# Patient Record
Sex: MALE | Race: ASIAN | NOT HISPANIC OR LATINO | URBAN - METROPOLITAN AREA
[De-identification: names, ages, dates, MRNs, and addresses within clinical notes are randomized per-mention and may not be internally consistent; named-entity substitution may affect disease eponyms.]

---

## 2017-05-19 ENCOUNTER — EMERGENCY (EMERGENCY)
Facility: HOSPITAL | Age: 57
LOS: 1 days | Discharge: HOME | End: 2017-05-19
Attending: EMERGENCY MEDICINE
Payer: MEDICAID

## 2017-05-19 VITALS
OXYGEN SATURATION: 97 % | TEMPERATURE: 99 F | HEIGHT: 66 IN | SYSTOLIC BLOOD PRESSURE: 131 MMHG | HEART RATE: 87 BPM | WEIGHT: 179.9 LBS | DIASTOLIC BLOOD PRESSURE: 72 MMHG | RESPIRATION RATE: 16 BRPM

## 2017-05-19 PROCEDURE — 99283 EMERGENCY DEPT VISIT LOW MDM: CPT

## 2017-05-19 PROCEDURE — 99282 EMERGENCY DEPT VISIT SF MDM: CPT

## 2017-05-19 NOTE — ED PROVIDER NOTE - OBJECTIVE STATEMENT
55 yo M with HTN, HLP, DM, depression, peripheral neuropathy that presents with generalized weakness x 2 years. Reports that he gets weak after walking approx 10 mins. No chest pain, SOB, vision/hearing changes, abd pain, N/V/D, travel, chills, night sweats, weight loss. He has seen PMD, neurology, cardiology, PMR, pain mgmt and all workup is negative. He brought paperwork showing normal MRI, CT head/brain, cardiac workup and stress, carotid US neg, muscle testing normal. He had recent XR knees which shows arthritis but no Fx or other pathology. He is here for another opinion. Denies any new or changes in symptoms. No change in meds.

## 2017-05-19 NOTE — ED ADULT TRIAGE NOTE - CHIEF COMPLAINT QUOTE
multiple loss of balance over 2 year, right knee pain over 1 year. went to multiple hospitals with no concrete answer.

## 2017-05-19 NOTE — ED ADULT NURSE NOTE - MUSCULOSKELETAL WDL
Full range of motion of upper and lower extremities, no joint tenderness/swelling. ambulates with cane

## 2017-05-19 NOTE — ED PROVIDER NOTE - MEDICAL DECISION MAKING DETAILS
55 yo M that multiple medical problems that was brought in by family for generalized weakness symptoms ongoing x 2 years. Has seen multiple MDs including PMD, neuro, cards, PMR and all workup has been found to be normal. No new symptoms. Explained to pt that I can obtain labs and imaging but most likely not going to find any new abnormalities as no new symptoms. All chronic. Eating and drinking well. Symptoms onset after 10 mins walking but just weakness. No SOB, chest pain, vision/hearing changes. Unlikely cardiac. Has had MRI and US and thorough workup and all neg. They do not want to stay for further testing. Will go home f/u with PMD and neuro. I explained return for worsening symptoms such as inability to walk or eat or drink or fevers, chills.

## 2017-05-19 NOTE — ED PROVIDER NOTE - PMH
Arthritis    Diabetes    Essential hypertension    Hyperlipidemia, unspecified hyperlipidemia type    Peripheral vascular disease

## 2017-05-23 DIAGNOSIS — I10 ESSENTIAL (PRIMARY) HYPERTENSION: ICD-10-CM

## 2017-05-23 DIAGNOSIS — I73.9 PERIPHERAL VASCULAR DISEASE, UNSPECIFIED: ICD-10-CM

## 2017-05-23 DIAGNOSIS — F32.9 MAJOR DEPRESSIVE DISORDER, SINGLE EPISODE, UNSPECIFIED: ICD-10-CM

## 2017-05-23 DIAGNOSIS — E11.42 TYPE 2 DIABETES MELLITUS WITH DIABETIC POLYNEUROPATHY: ICD-10-CM

## 2017-05-23 DIAGNOSIS — M17.0 BILATERAL PRIMARY OSTEOARTHRITIS OF KNEE: ICD-10-CM

## 2017-05-23 DIAGNOSIS — R53.1 WEAKNESS: ICD-10-CM

## 2017-05-23 DIAGNOSIS — E78.5 HYPERLIPIDEMIA, UNSPECIFIED: ICD-10-CM
